# Patient Record
Sex: FEMALE | Race: WHITE | NOT HISPANIC OR LATINO | Employment: UNEMPLOYED | URBAN - METROPOLITAN AREA
[De-identification: names, ages, dates, MRNs, and addresses within clinical notes are randomized per-mention and may not be internally consistent; named-entity substitution may affect disease eponyms.]

---

## 2017-01-26 RX ORDER — AMITRIPTYLINE HYDROCHLORIDE 25 MG/1
TABLET, FILM COATED ORAL
Qty: 90 TAB | Refills: 0 | Status: SHIPPED | OUTPATIENT
Start: 2017-01-26 | End: 2017-05-01 | Stop reason: SDUPTHER

## 2017-05-01 RX ORDER — AMITRIPTYLINE HYDROCHLORIDE 25 MG/1
TABLET, FILM COATED ORAL
Qty: 90 TAB | Refills: 0 | Status: SHIPPED | OUTPATIENT
Start: 2017-05-01 | End: 2017-09-04 | Stop reason: SDUPTHER

## 2017-05-17 ENCOUNTER — NON-PROVIDER VISIT (OUTPATIENT)
Dept: URGENT CARE | Facility: CLINIC | Age: 22
End: 2017-05-17

## 2017-05-17 DIAGNOSIS — Z11.1 PPD SCREENING TEST: ICD-10-CM

## 2017-05-17 PROCEDURE — 86580 TB INTRADERMAL TEST: CPT | Performed by: PHYSICIAN ASSISTANT

## 2017-05-19 ENCOUNTER — NON-PROVIDER VISIT (OUTPATIENT)
Dept: URGENT CARE | Facility: CLINIC | Age: 22
End: 2017-05-19
Payer: COMMERCIAL

## 2017-05-19 LAB — TB WHEAL 3D P 5 TU DIAM: NORMAL MM

## 2017-05-31 ENCOUNTER — TELEPHONE (OUTPATIENT)
Dept: MEDICAL GROUP | Age: 22
End: 2017-05-31

## 2017-05-31 NOTE — TELEPHONE ENCOUNTER
ESTABLISHED PATIENT PRE-VISIT PLANNING     Note: Patient will not be contacted if there is no indication to call.     1.  Reviewed note from last office visit with PCP and/or other med group provider: Yes    2.  If any orders were placed at last visit, do we have Results/Consult Notes?        •  Labs - Labs were not ordered at last office visit.       •  Imaging - Imaging was not ordered at last office visit.       •  Referrals - Referral ordered, patient has NOT been seen.    3.  Immunizations were updated in Epic using WebIZ?: No WebIZ record       •  Web Iz Recommendations:     4.  Patient is due for the following Health Maintenance Topics:   Health Maintenance Due   Topic Date Due   • IMM HEP B VACCINE (1 of 3 - Primary Series) 1993   • IMM HEP A VACCINE (1 of 2 - Standard Series) 05/12/1994   • IMM HPV VACCINE (1 of 3 - Female 3 Dose Series) 05/12/2004   • IMM VARICELLA (CHICKENPOX) VACCINE (1 of 2 - 2 Dose Adolescent Series) 05/12/2006   • CHLAMYDIA SCREENING  05/12/2009   • IMM DTaP/Tdap/Td Vaccine (1 - Tdap) 05/12/2012   • PAP SMEAR  05/12/2014           5.  Patient was not informed to arrive 15 min prior to their scheduled appointment and bring in their medication bottles.

## 2017-06-01 ENCOUNTER — OFFICE VISIT (OUTPATIENT)
Dept: MEDICAL GROUP | Age: 22
End: 2017-06-01
Payer: COMMERCIAL

## 2017-06-01 VITALS
BODY MASS INDEX: 26.61 KG/M2 | SYSTOLIC BLOOD PRESSURE: 114 MMHG | HEIGHT: 63 IN | OXYGEN SATURATION: 98 % | DIASTOLIC BLOOD PRESSURE: 64 MMHG | TEMPERATURE: 98.9 F | HEART RATE: 87 BPM | WEIGHT: 150.2 LBS

## 2017-06-01 DIAGNOSIS — Z13.220 SCREENING, LIPID: ICD-10-CM

## 2017-06-01 DIAGNOSIS — R29.818 AURA: ICD-10-CM

## 2017-06-01 DIAGNOSIS — Z13.21 ENCOUNTER FOR VITAMIN DEFICIENCY SCREENING: ICD-10-CM

## 2017-06-01 DIAGNOSIS — F41.9 ANXIETY: ICD-10-CM

## 2017-06-01 DIAGNOSIS — G44.229 CHRONIC TENSION-TYPE HEADACHE, NOT INTRACTABLE: ICD-10-CM

## 2017-06-01 PROCEDURE — 99214 OFFICE O/P EST MOD 30 MIN: CPT | Performed by: FAMILY MEDICINE

## 2017-06-01 RX ORDER — HYDROXYZINE HYDROCHLORIDE 25 MG/1
25 TABLET, FILM COATED ORAL 3 TIMES DAILY PRN
Qty: 30 TAB | Refills: 3 | Status: SHIPPED | OUTPATIENT
Start: 2017-06-01

## 2017-06-01 NOTE — PROGRESS NOTES
SUBJECTIVE:      Chief Complaint   Patient presents with   • Headache     poss tension       HPI:     Kayleigh Ruby is a 22 y.o. female here for symptoms of headache symptoms.   Headaches- are usually mostly tension type, feels on temple region bilaterally. Usually has about 1-2 times a week and may affect her vision prior.  Rarely has migraines. Overall has been improved and stable on amitriptyline 25 mg daily. She does tend to worry at times.   States that when she had made the appointment she was experiencing more frequent headaches mostly in the frontal region. She has started a new job and was very busy at the time and noticed when the stress decreased that her headaches improved. She does not have any significant sinus issues. She overall sleeps well and drinks adequate fluids.    She also complains of intermittently feeling déjà vu for a few seconds as something she has experienced before or may have had in a dream. She had initially noticed some symptoms last spring which lasted a few times. Then her symptoms seemed to resolve until they returned a few weeks ago this year. No prior history of seizures. Denies episodes of staring off into space or others who have noticed this. Denies any prior history of psychological or physical trauma. She notes feeling briefly hot lately dizzy afterward.     ROS:  Denies any recent fevers or chills. No nausea or vomiting. No diarrhea. No chest pains or shortness of breath. No lower extremity edema.    Current Outpatient Prescriptions on File Prior to Visit   Medication Sig Dispense Refill   • amitriptyline (ELAVIL) 25 MG Tab TAKE 1 TABLET BY MOUTH IN THE EVENING 90 Tab 0   • amitriptyline (ELAVIL) 25 MG Tab TAKE 1 TABLET BY MOUTH IN THE EVENING 90 Tab 0   • Hydrocod Polst-CPM Polst ER (TUSSIONEX) 10-8 MG/5ML Suspension Extended Release Take 5 mL by mouth every 12 hours. 100 mL 0   • benzonatate (TESSALON) 100 MG Cap Take 1 Cap by mouth 3 times a day as needed for  "Cough. 60 Cap 0   • IOPHEN C-NR Liquid oral solution 5ML BY MOUTH AT BEDTIME AS NEEDED 120 mL 0   • maalox plus-benadryl-visc lidocaine (MAGIC MOUTHWASH) Take 5 mL by mouth every 6 hours as needed. (Patient not taking: Reported on 12/22/2016) 100 mL 0     No current facility-administered medications on file prior to visit.       No Known Allergies    Past Medical History   Diagnosis Date   • GI symptoms    • Headache              OBJECTIVE:   /64 mmHg  Pulse 87  Temp(Src) 37.2 °C (98.9 °F)  Ht 1.6 m (5' 3\")  Wt 68.13 kg (150 lb 3.2 oz)  BMI 26.61 kg/m2  SpO2 98%  LMP 05/08/2017  Breastfeeding? No  General: Well-developed well-nourished female, no acute distress  HEENT: oropharynx clear, eyes clear, TMs clear and intact, PERRL, EOMI  Neck: supple, no lymphadenopathy- cervical or supraclavicular, no thyromegaly  Cardiovascular: regular rate and rhythm, no murmurs, gallops, rubs  Lungs: clear to auscultation bilaterally, no wheezes, crackles, or rhonchi  Abdomen: +bowel sounds, soft, nontender, nondistended, no rebound, no guarding, no hepatosplenomegaly  Extremities: no cyanosis, clubbing, edema  Skin: Warm and dry  Psych: appropriate mood and affect  Neuro: 2+ DTR throughout, 5/5 strength throughout, CN II through XII intact bilaterally, negative Romberg      ASSESSMENT/PLAN:    22-year-old female.      1. Chronic tension-type headache, not intractable- appears headaches-associated with tension and stress.   -Reviewed stress management techniques. She will continue on Elavil 25 mg daily at this time. Recommend routine exercise, adequate fluids and appropriate diet.     2. Anxiety-counseled on management of symptoms.  CBC WITH DIFFERENTIAL    COMP METABOLIC PANEL    hydrOXYzine (ATARAX) 25 MG Tab    TSH    REFERRAL TO PSYCHOLOGY   3. Aura- will obtain blood work. Consider further evaluation with brain MRI. Advised internal symptoms.  CBC WITH DIFFERENTIAL    COMP METABOLIC PANEL    TSH    REFERRAL TO " PSYCHOLOGY   4. Screening, lipid  LIPID PROFILE   5. Encounter for vitamin deficiency screening  VITAMIN D,25 HYDROXY         Return in about 1 month (around 7/1/2017).    This medical record contains text that has been entered with the assistance of computer voice recognition and dictation software.  Therefore, it may contain unintended errors in text, spelling, punctuation, or grammar.

## 2017-06-01 NOTE — MR AVS SNAPSHOT
"        Kayleigh Rain Tung   2017 8:40 AM   Office Visit   MRN: [id number]    Department:  [address] [place] [address]   Dept Phone:  [phone]    Description:  Female : 1995   Provider:  Nora Andre M.D.           Reason for Visit     Headache poss tension      Allergies as of 2017     No Known Allergies      You were diagnosed with     Anxiety   [970595]       Screening, lipid   [992692]       Aura   [923051]       Encounter for vitamin deficiency screening   [824190]         Vital Signs     Blood Pressure Pulse Temperature Height Weight Body Mass Index    114/64 mmHg 87 37.2 °C (98.9 °F) 1.6 m (5' 3\") 68.13 kg (150 lb 3.2 oz) 26.61 kg/m2    Oxygen Saturation Last Menstrual Period Breastfeeding? Smoking Status          98% 2017 No Never Smoker         Basic Information     Date Of Birth Sex Race Ethnicity Preferred Language    1995 Female White Non- English      Your appointments     2017 10:00 AM   Established Patient with Nora Andre M.D.   [address])    [address] [place] [id number] [phone]           You will be receiving a confirmation call a few days before your appointment from our automated call confirmation system.              Problem List              ICD-10-CM Priority Class Noted - Resolved    Chronic tension-type headache, not intractable G44.229   2016 - Present      Health Maintenance        Date Due Completion Dates    IMM HEP B VACCINE (1 of 3 - Primary Series) 1993 ---    IMM HEP A VACCINE (1 of 2 - Standard Series) 1994 ---    IMM HPV VACCINE (1 of 3 - Female 3 Dose Series) 2004 ---    IMM VARICELLA (CHICKENPOX) VACCINE (1 of 2 - 2 Dose Adolescent Series) 2006 ---    IMM DTaP/Tdap/Td Vaccine (1 - Tdap) 2012 ---    PAP SMEAR 2014 ---            Current Immunizations     Tuberculin Skin Test 2017 11:37 AM      Below and/or attached are the medications your " provider expects you to take. Review all of your home medications and newly ordered medications with your provider and/or pharmacist. Follow medication instructions as directed by your provider and/or pharmacist. Please keep your medication list with you and share with your provider. Update the information when medications are discontinued, doses are changed, or new medications (including over-the-counter products) are added; and carry medication information at all times in the event of emergency situations     Allergies:  No Known Allergies          Medications  Valid as of: June 01, 2017 -  9:39 AM    Generic Name Brand Name Tablet Size Instructions for use    Amitriptyline HCl (Tab) ELAVIL 25 MG TAKE 1 TABLET BY MOUTH IN THE EVENING        Amitriptyline HCl (Tab) ELAVIL 25 MG TAKE 1 TABLET BY MOUTH IN THE EVENING        Benzonatate (Cap) TESSALON 100 MG Take 1 Cap by mouth 3 times a day as needed for Cough.        Guaifenesin-Codeine (Liquid) IOPHEN C--10 MG/5ML 5ML BY MOUTH AT BEDTIME AS NEEDED        Hydrocod Polst-Chlorphen Polst (Suspension Extended Release) TUSSIONEX 10-8 MG/5ML Take 5 mL by mouth every 12 hours.        HydrOXYzine HCl (Tab) ATARAX 25 MG Take 1 Tab by mouth 3 times a day as needed for Anxiety (1/2- 1 tab tid prn anxiety).        maalox plus-benadryl-visc lidocaine (MAGIC MOUTHWASH) MAGIC MOUTHWASH  Take 5 mL by mouth every 6 hours as needed.        .                 Medicines prescribed today were sent to:     [facility]/PHARMACY #[id number] - [place], [place] - [address] [place] [id number]    Phone: [phone] Fax: [phone]    Open 24 Hours?: No      Medication refill instructions:       If your prescription bottle indicates you have medication refills left, it is not necessary to call your provider’s office. Please contact your pharmacy and they will refill your medication.    If your prescription bottle indicates you do not have any  refills left, you may request refills at any time through one of the following ways: The online [email or web address] system (except Urgent Care), by calling your provider’s office, or by asking your pharmacy to contact your provider’s office with a refill request. Medication refills are processed only during regular business hours and may not be available until the next business day. Your provider may request additional information or to have a follow-up visit with you prior to refilling your medication.   *Please Note: Medication refills are assigned a new Rx number when refilled electronically. Your pharmacy may indicate that no refills were authorized even though a new prescription for the same medication is available at the pharmacy. Please request the medicine by name with the pharmacy before contacting your provider for a refill.        Your To Do List     Future Labs/Procedures Complete By Expires    CBC WITH DIFFERENTIAL  As directed 6/1/2018    COMP METABOLIC PANEL  As directed 11/29/2017    LIPID PROFILE  As directed 11/29/2017    TSH  As directed 6/1/2018    VITAMIN D,25 HYDROXY  As directed 12/2/2017      Referral     A referral request has been sent to our patient care coordination department. Please allow 3-5 business days for us to process this request and contact you either by phone or mail. If you do not hear from us by the 5th business day, please call us at [phone].           [email or web address] Access Code: Activation code not generated  Current [email or web address] Status: Active

## 2017-06-23 ENCOUNTER — APPOINTMENT (OUTPATIENT)
Dept: MEDICAL GROUP | Age: 22
End: 2017-06-23
Payer: COMMERCIAL

## 2017-08-09 ENCOUNTER — OFFICE VISIT (OUTPATIENT)
Dept: MEDICAL GROUP | Age: 22
End: 2017-08-09
Payer: COMMERCIAL

## 2017-08-09 VITALS
WEIGHT: 146.4 LBS | BODY MASS INDEX: 25.94 KG/M2 | DIASTOLIC BLOOD PRESSURE: 64 MMHG | SYSTOLIC BLOOD PRESSURE: 110 MMHG | HEIGHT: 63 IN | TEMPERATURE: 97.7 F | HEART RATE: 96 BPM | OXYGEN SATURATION: 98 %

## 2017-08-09 DIAGNOSIS — J02.9 SORE THROAT: ICD-10-CM

## 2017-08-09 DIAGNOSIS — J02.9 ACUTE PHARYNGITIS, UNSPECIFIED ETIOLOGY: ICD-10-CM

## 2017-08-09 LAB
INT CON NEG: NEGATIVE
INT CON POS: POSITIVE
S PYO AG THROAT QL: NORMAL

## 2017-08-09 PROCEDURE — 87880 STREP A ASSAY W/OPTIC: CPT | Performed by: INTERNAL MEDICINE

## 2017-08-09 PROCEDURE — 99214 OFFICE O/P EST MOD 30 MIN: CPT | Performed by: INTERNAL MEDICINE

## 2017-08-09 RX ORDER — AMOXICILLIN AND CLAVULANATE POTASSIUM 875; 125 MG/1; MG/1
1 TABLET, FILM COATED ORAL 2 TIMES DAILY
Qty: 20 TAB | Refills: 0 | Status: SHIPPED | OUTPATIENT
Start: 2017-08-09 | End: 2017-08-19

## 2017-08-09 ASSESSMENT — PAIN SCALES - GENERAL: PAINLEVEL: 3=SLIGHT PAIN

## 2017-08-09 NOTE — ASSESSMENT & PLAN NOTE
This is a new problem. Patient reported that she has severe pain on her throat started yesterday morning and she feels swelling and tender when she swallows. She has fever at home with temperature 101 decrease Fahrenheit last night. She denied cough or wheezing or runny nose. However, she has headache with fever last night. She stated that she works at  and has exposed to sick children. She takes Tylenol at home and have fever resolved with Tylenol.

## 2017-08-09 NOTE — PROGRESS NOTES
"Subjective:   Kayleigh Ruby is a 22 y.o. female here today for evaluation and management of:      Acute pharyngitis  This is a new problem. Patient reported that she has severe pain on her throat started yesterday morning and she feels swelling and tender when she swallows. She has fever at home with temperature 101 decrease Fahrenheit last night. She denied cough or wheezing or runny nose. However, she has headache with fever last night. She stated that she works at  and has exposed to sick children. She takes Tylenol at home and have fever resolved with Tylenol.           Current medicines (including changes today)  Current Outpatient Prescriptions   Medication Sig Dispense Refill   • amoxicillin-clavulanate (AUGMENTIN) 875-125 MG Tab Take 1 Tab by mouth 2 times a day for 10 days. 20 Tab 0   • hydrOXYzine (ATARAX) 25 MG Tab Take 1 Tab by mouth 3 times a day as needed for Anxiety (1/2- 1 tab tid prn anxiety). 30 Tab 3   • amitriptyline (ELAVIL) 25 MG Tab TAKE 1 TABLET BY MOUTH IN THE EVENING 90 Tab 0   • amitriptyline (ELAVIL) 25 MG Tab TAKE 1 TABLET BY MOUTH IN THE EVENING 90 Tab 0   • Hydrocod Polst-CPM Polst ER (TUSSIONEX) 10-8 MG/5ML Suspension Extended Release Take 5 mL by mouth every 12 hours. 100 mL 0   • benzonatate (TESSALON) 100 MG Cap Take 1 Cap by mouth 3 times a day as needed for Cough. 60 Cap 0   • IOPHEN C-NR Liquid oral solution 5ML BY MOUTH AT BEDTIME AS NEEDED 120 mL 0     No current facility-administered medications for this visit.     She  has a past medical history of GI symptoms and Headache.    ROS   No chest pain, no shortness of breath, no abdominal pain       Objective:     Blood pressure 110/64, pulse 96, temperature 36.5 °C (97.7 °F), height 1.6 m (5' 3\"), weight 66.407 kg (146 lb 6.4 oz), last menstrual period 07/24/2017, SpO2 98 %, not currently breastfeeding. Body mass index is 25.94 kg/(m^2).   Physical Exam:  General: Alert, oriented and no acute distress.  Eye " contact is good, speech goal directed, affect calm  HEENT: conjunctiva non-injected, sclera non-icteric.  Oral mucous membranes pink and moist with no lesions. Erythema and swelling on tonsils and oropharynx.  Pinna normal. TM pearly gray.   Neck No supraclavicular, submandibular, submental lymphadenopathy or masses in the neck or supraclavicular regions.  Lungs: Normal respiratory effort, clear to auscultation bilaterally with good excursion.  CV: regular rate and rhythm. No murmurs.   Abdomen: soft, non distended, nontender, Bowel sound normal.  Ext: no edema, color normal, vascularity normal, temperature normal        Assessment and Plan:   The following treatment plan was discussed     1. Acute pharyngitis, unspecified etiology  - Rapid strep test in clinic was negative. However, she has moderate swelling with erythema on her oropharynx and tonsils and she also has fever at 101 degrees Fahrenheit at home. So I will prescribed Augmentin for her. We discussed the use and side effects of Augmentin with patient.  - Advised to take probiotic with Augmentin.  - amoxicillin-clavulanate (AUGMENTIN) 875-125 MG Tab; Take 1 Tab by mouth 2 times a day for 10 days.  Dispense: 20 Tab; Refill: 0    2. Sore throat  - Advised to gargle her throat with warm salt water 3 times a day. Advised to avoid spicy foods, alcohol, or greasy food.  - She may take Tylenol as needed for pain and fever.  - Advised to increase water intake.   - POCT Rapid Strep A        Followup: Return if symptoms worsen or fail to improve.      Please note that this dictation was created using voice recognition software. I have made every reasonable attempt to correct obvious errors, but I expect that there may have unintended errors in text, spelling, punctuation, or grammar that I did not discover.

## 2017-08-09 NOTE — MR AVS SNAPSHOT
"        Kayleigh Ruby   2017 1:40 PM   Office Visit   MRN: [id number]    Department:  [address] [place] [address]   Dept Phone:  [phone]    Description:  Female : 1995   Provider:  Tere Inman M.D.           Reason for Visit     Pharyngitis x 1 day      Allergies as of 2017     No Known Allergies      You were diagnosed with     Acute pharyngitis, unspecified etiology   [7628285]       Sore throat   [824413]         Vital Signs     Blood Pressure Pulse Temperature Height Weight Body Mass Index    110/64 mmHg 96 36.5 °C (97.7 °F) 1.6 m (5' 3\") 66.407 kg (146 lb 6.4 oz) 25.94 kg/m2    Oxygen Saturation Last Menstrual Period Breastfeeding? Smoking Status          98% 2017 No Never Smoker         Basic Information     Date Of Birth Sex Race Ethnicity Preferred Language    1995 Female White Non- English      Your appointments     Aug 17, 2017  8:40 AM   Established Patient with Nora Andre M.D.   [address] [id number] [phone]           You will be receiving a confirmation call a few days before your appointment from our automated call confirmation system.              Problem List              ICD-10-CM Priority Class Noted - Resolved    Chronic tension-type headache, not intractable G44.229   2016 - Present    Acute pharyngitis J02.9   2017 - Present      Health Maintenance        Date Due Completion Dates    IMM HEP B VACCINE (1 of 3 - Primary Series) 1993 ---    IMM HEP A VACCINE (1 of 2 - Standard Series) 1994 ---    IMM HPV VACCINE (1 of 3 - Female 3 Dose Series) 2004 ---    IMM VARICELLA (CHICKENPOX) VACCINE (1 of 2 - 2 Dose Adolescent Series) 2006 ---    IMM DTaP/Tdap/Td Vaccine (1 - Tdap) 2012 ---    PAP SMEAR 2014 ---    IMM INFLUENZA (1) 2017 ---            Results     POCT Rapid Strep A      Component    Rapid Strep Screen    neg    Internal Control " Positive    Positive    Internal Control Negative    Negative                        Current Immunizations     Tuberculin Skin Test 5/17/2017 11:37 AM      Below and/or attached are the medications your provider expects you to take. Review all of your home medications and newly ordered medications with your provider and/or pharmacist. Follow medication instructions as directed by your provider and/or pharmacist. Please keep your medication list with you and share with your provider. Update the information when medications are discontinued, doses are changed, or new medications (including over-the-counter products) are added; and carry medication information at all times in the event of emergency situations     Allergies:  No Known Allergies          Medications  Valid as of: August 09, 2017 -  4:58 PM    Generic Name Brand Name Tablet Size Instructions for use    Amitriptyline HCl (Tab) ELAVIL 25 MG TAKE 1 TABLET BY MOUTH IN THE EVENING        Amitriptyline HCl (Tab) ELAVIL 25 MG TAKE 1 TABLET BY MOUTH IN THE EVENING        Amoxicillin-Pot Clavulanate (Tab) AUGMENTIN 875-125 MG Take 1 Tab by mouth 2 times a day for 10 days.        Benzonatate (Cap) TESSALON 100 MG Take 1 Cap by mouth 3 times a day as needed for Cough.        Guaifenesin-Codeine (Liquid) IOPHEN C--10 MG/5ML 5ML BY MOUTH AT BEDTIME AS NEEDED        Hydrocod Polst-Chlorphen Polst (Suspension Extended Release) TUSSIONEX 10-8 MG/5ML Take 5 mL by mouth every 12 hours.        HydrOXYzine HCl (Tab) ATARAX 25 MG Take 1 Tab by mouth 3 times a day as needed for Anxiety (1/2- 1 tab tid prn anxiety).        .                 Medicines prescribed today were sent to:     [facility]/PHARMACY #[id number] - [place], [place] - [address] [facility] AT [address] [place] [address]    Phone: [phone] Fax: [phone]    Open 24 Hours?: No      Medication refill instructions:       If your prescription bottle indicates you have medication refills  left, it is not necessary to call your provider’s office. Please contact your pharmacy and they will refill your medication.    If your prescription bottle indicates you do not have any refills left, you may request refills at any time through one of the following ways: The online [employer] system (except Urgent Care), by calling your provider’s office, or by asking your pharmacy to contact your provider’s office with a refill request. Medication refills are processed only during regular business hours and may not be available until the next business day. Your provider may request additional information or to have a follow-up visit with you prior to refilling your medication.   *Please Note: Medication refills are assigned a new Rx number when refilled electronically. Your pharmacy may indicate that no refills were authorized even though a new prescription for the same medication is available at the pharmacy. Please request the medicine by name with the pharmacy before contacting your provider for a refill.           [employer] Access Code: Activation code not generated  Current [employer] Status: Active

## 2017-08-16 ENCOUNTER — TELEPHONE (OUTPATIENT)
Dept: MEDICAL GROUP | Age: 22
End: 2017-08-16

## 2017-08-16 NOTE — TELEPHONE ENCOUNTER
ESTABLISHED PATIENT PRE-VISIT PLANNING     Note: Patient will not be contacted if there is no indication to call.     1.  Reviewed notes from the last few office visits within the medical group: Yes    2.  If any orders were placed at last visit or intended to be done for this visit (i.e. 6 mos follow-up), do we have Results/Consult Notes?        •  Labs - Labs were not ordered at last office visit.       •  Imaging - Imaging was not ordered at last office visit.       •  Referrals - No referrals were ordered at last office visit.    3. Is this appointment scheduled as a Hospital Follow-Up? No    4.  Immunizations were updated in Epic using WebIZ?: No WebIZ record       •  Web Iz Recommendations:     5.  Patient is due for the following Health Maintenance Topics:   Health Maintenance Due   Topic Date Due   • IMM HEP B VACCINE (1 of 3 - Primary Series) 1995   • IMM HEP A VACCINE (1 of 2 - Standard Series) 05/12/1994   • IMM HPV VACCINE (1 of 3 - Female 3 Dose Series) 05/12/2004   • IMM VARICELLA (CHICKENPOX) VACCINE (1 of 2 - 2 Dose Adolescent Series) 05/12/2006   • CHLAMYDIA SCREENING  05/12/2009   • IMM DTaP/Tdap/Td Vaccine (1 - Tdap) 05/12/2012   • PAP SMEAR  05/12/2014           6.  Patient was NOT informed to arrive 15 min prior to their scheduled appointment and bring in their medication bottles.